# Patient Record
Sex: MALE | Race: OTHER
[De-identification: names, ages, dates, MRNs, and addresses within clinical notes are randomized per-mention and may not be internally consistent; named-entity substitution may affect disease eponyms.]

---

## 2018-06-23 ENCOUNTER — HOSPITAL ENCOUNTER (EMERGENCY)
Dept: HOSPITAL 72 - EMR | Age: 53
Discharge: HOME | End: 2018-06-23
Payer: MEDICAID

## 2018-06-23 VITALS — WEIGHT: 230 LBS | BODY MASS INDEX: 36.1 KG/M2 | HEIGHT: 67 IN

## 2018-06-23 VITALS — SYSTOLIC BLOOD PRESSURE: 150 MMHG | DIASTOLIC BLOOD PRESSURE: 76 MMHG

## 2018-06-23 VITALS — DIASTOLIC BLOOD PRESSURE: 76 MMHG | SYSTOLIC BLOOD PRESSURE: 150 MMHG

## 2018-06-23 DIAGNOSIS — R07.9: ICD-10-CM

## 2018-06-23 DIAGNOSIS — I10: ICD-10-CM

## 2018-06-23 DIAGNOSIS — R05: ICD-10-CM

## 2018-06-23 DIAGNOSIS — R10.13: Primary | ICD-10-CM

## 2018-06-23 DIAGNOSIS — E11.9: ICD-10-CM

## 2018-06-23 LAB
ADD MANUAL DIFF: NO
ALBUMIN SERPL-MCNC: 4.2 G/DL (ref 3.4–5)
ALBUMIN/GLOB SERPL: 1.2 {RATIO} (ref 1–2.7)
ALP SERPL-CCNC: 82 U/L (ref 46–116)
ALT SERPL-CCNC: 43 U/L (ref 12–78)
ANION GAP SERPL CALC-SCNC: 8 MMOL/L (ref 5–15)
AST SERPL-CCNC: 21 U/L (ref 15–37)
BASOPHILS NFR BLD AUTO: 2.1 % (ref 0–2)
BILIRUB SERPL-MCNC: 0.3 MG/DL (ref 0.2–1)
BUN SERPL-MCNC: 12 MG/DL (ref 7–18)
CALCIUM SERPL-MCNC: 9 MG/DL (ref 8.5–10.1)
CHLORIDE SERPL-SCNC: 105 MMOL/L (ref 98–107)
CK MB SERPL-MCNC: 1.5 NG/ML (ref 0–3.6)
CK SERPL-CCNC: 177 U/L (ref 26–308)
CO2 SERPL-SCNC: 26 MMOL/L (ref 21–32)
CREAT SERPL-MCNC: 1.2 MG/DL (ref 0.55–1.3)
EOSINOPHIL NFR BLD AUTO: 2.4 % (ref 0–3)
ERYTHROCYTE [DISTWIDTH] IN BLOOD BY AUTOMATED COUNT: 10.7 % (ref 11.6–14.8)
GLOBULIN SER-MCNC: 3.6 G/DL
HCT VFR BLD CALC: 42.6 % (ref 42–52)
HGB BLD-MCNC: 14.8 G/DL (ref 14.2–18)
LYMPHOCYTES NFR BLD AUTO: 34.5 % (ref 20–45)
MCV RBC AUTO: 89 FL (ref 80–99)
MONOCYTES NFR BLD AUTO: 10.5 % (ref 1–10)
NEUTROPHILS NFR BLD AUTO: 50.6 % (ref 45–75)
PLATELET # BLD: 249 K/UL (ref 150–450)
POTASSIUM SERPL-SCNC: 3.7 MMOL/L (ref 3.5–5.1)
RBC # BLD AUTO: 4.77 M/UL (ref 4.7–6.1)
SODIUM SERPL-SCNC: 139 MMOL/L (ref 136–145)
WBC # BLD AUTO: 7.4 K/UL (ref 4.8–10.8)

## 2018-06-23 PROCEDURE — 87040 BLOOD CULTURE FOR BACTERIA: CPT

## 2018-06-23 PROCEDURE — 85025 COMPLETE CBC W/AUTO DIFF WBC: CPT

## 2018-06-23 PROCEDURE — 36415 COLL VENOUS BLD VENIPUNCTURE: CPT

## 2018-06-23 PROCEDURE — 99283 EMERGENCY DEPT VISIT LOW MDM: CPT

## 2018-06-23 PROCEDURE — 80053 COMPREHEN METABOLIC PANEL: CPT

## 2018-06-23 PROCEDURE — 84484 ASSAY OF TROPONIN QUANT: CPT

## 2018-06-23 PROCEDURE — 83880 ASSAY OF NATRIURETIC PEPTIDE: CPT

## 2018-06-23 PROCEDURE — 93005 ELECTROCARDIOGRAM TRACING: CPT

## 2018-06-23 PROCEDURE — 82550 ASSAY OF CK (CPK): CPT

## 2018-06-23 PROCEDURE — 71045 X-RAY EXAM CHEST 1 VIEW: CPT

## 2018-06-23 PROCEDURE — 82553 CREATINE MB FRACTION: CPT

## 2018-06-23 NOTE — DIAGNOSTIC IMAGING REPORT
EXAM:

  XR Chest, 1 View

 

CLINICAL HISTORY:

  Chest pain

 

TECHNIQUE:

  Frontal view of the chest.

 

COMPARISON:

  No relevant prior studies available.

 

FINDINGS:

  Lungs:  Unremarkable.  No consolidation.

  Pleural space:  Unremarkable.  No pneumothorax.

  Heart:  Unremarkable.  No cardiomegaly.

  Mediastinum:  Unremarkable.

  Bones/joints:  No acute osseous abnormality.

 

IMPRESSION:     

  No acute cardiopulmonary process.

## 2018-06-23 NOTE — EMERGENCY ROOM REPORT
History of Present Illness


General


Chief Complaint:  Upper Respiratory Illness


Source:  Patient





Present Illness


HPI


Patient presents with complaints of epigastric pain burning sensation in the 

epigastric area





Reports that the symptoms worsened after he eats or drinks anything





Also had reported midsternal chest pain


Denies any diarrhea denies any lower abdominal pain


Patient is on proton pump inhibitor along with ACE inhibitor





Patient also has a mild cough


Allergies:  


Coded Allergies:  


     No Known Allergies (Unverified , 6/23/18)





Patient History


Past Medical History:  see triage record


Pertinent Family History:  none


Reviewed Nursing Documentation:  PMH: Agreed; PSxH: Agreed





Nursing Documentation-PMH


Past Medical History:  No History, Except For


Hx Hypertension:  Yes


Hx Diabetes:  Yes





Review of Systems


All Other Systems:  negative except mentioned in HPI





Physical Exam





Vital Signs








  Date Time  Temp Pulse Resp B/P (MAP) Pulse Ox O2 Delivery O2 Flow Rate FiO2


 


6/23/18 19:21 98.3 115 18 150/76 95 Room Air  





 98.2       








Sp02 EP Interpretation:  reviewed, normal


General Appearance:  well appearing, no apparent distress


Head:  normocephalic, atraumatic


Eyes:  bilateral eye PERRL, bilateral eye EOMI


ENT:  hearing grossly normal, normal pharynx, TMs + canals normal, uvula midline


Neck:  full range of motion, supple, no meningismus, no bony tend


Respiratory:  lungs clear, normal breath sounds, no rhonchi, no respiratory 

distress, no retraction, no accessory muscle use


Cardiovascular #1:  normal peripheral pulses, regular rate, rhythm, no edema, 

no gallop, no JVD, no murmur


Gastrointestinal:  normal bowel sounds, non tender, soft, no mass, no 

organomegaly, non-distended, no guarding, no hernia, no pulsatile mass, no 

rebound


Genitourinary:  no CVA tenderness


Musculoskeletal:  normal inspection


Neurologic:  oriented x3, responsive, CNs III-XII nml as tested, motor strength/

tone normal, sensory intact


Psychiatric:  mood/affect normal


Skin:  normal color, no rash, warm/dry, palpation normal


Lymphatic:  normal inspection, no adenopathy





Medical Decision Making


Diagnostic Impression:  


 Primary Impression:  


 Abdominal pain


 Additional Impressions:  


 Cough


 Cough due to ACE inhibitor


ER Course


Patient is a fairly complex patient with multiple differential to consideration 

including but not limited to cardiac cardiopulmonary and vascular emergencies





Patient's EKG and blood work are appropriate


X-ray was also negative I did discuss with the patient possibility of his blood 

pressure medication causing some of the cough patient also has clinical 

description of reflux esophagitis





Require close outpatient follow-up





Labs








Test


  6/23/18


19:40


 


White Blood Count


  7.4 K/UL


(4.8-10.8)


 


Red Blood Count


  4.77 M/UL


(4.70-6.10)


 


Hemoglobin


  14.8 G/DL


(14.2-18.0)


 


Hematocrit


  42.6 %


(42.0-52.0)


 


Mean Corpuscular Volume 89 FL (80-99) 


 


Mean Corpuscular Hemoglobin


  30.9 PG


(27.0-31.0)


 


Mean Corpuscular Hemoglobin


Concent 34.7 G/DL


(32.0-36.0)


 


Red Cell Distribution Width


  10.7 %


(11.6-14.8)


 


Platelet Count


  249 K/UL


(150-450)


 


Mean Platelet Volume


  6.6 FL


(6.5-10.1)


 


Neutrophils (%) (Auto)


  50.6 %


(45.0-75.0)


 


Lymphocytes (%) (Auto)


  34.5 %


(20.0-45.0)


 


Monocytes (%) (Auto)


  10.5 %


(1.0-10.0)


 


Eosinophils (%) (Auto)


  2.4 %


(0.0-3.0)


 


Basophils (%) (Auto)


  2.1 %


(0.0-2.0)


 


Sodium Level


  139 MMOL/L


(136-145)


 


Potassium Level


  3.7 MMOL/L


(3.5-5.1)


 


Chloride Level


  105 MMOL/L


()


 


Carbon Dioxide Level


  26 MMOL/L


(21-32)


 


Anion Gap


  8 mmol/L


(5-15)


 


Blood Urea Nitrogen


  12 mg/dL


(7-18)


 


Creatinine


  1.2 MG/DL


(0.55-1.30)


 


Estimat Glomerular Filtration


Rate > 60 mL/min


(>60)


 


Glucose Level


  108 MG/DL


()


 


Calcium Level


  9.0 MG/DL


(8.5-10.1)


 


Total Bilirubin


  0.3 MG/DL


(0.2-1.0)


 


Aspartate Amino Transf


(AST/SGOT) 21 U/L (15-37) 


 


 


Alanine Aminotransferase


(ALT/SGPT) 43 U/L (12-78) 


 


 


Alkaline Phosphatase


  82 U/L


()


 


Total Creatine Kinase


  177 U/L


()


 


Creatine Kinase MB


  1.5 NG/ML


(0.0-3.6)


 


Creatine Kinase MB Relative


Index 0.8 


 


 


Troponin I


  0.000 ng/mL


(0.000-0.056)


 


Pro-B-Type Natriuretic Peptide


  8 pg/mL


(0-125)


 


Total Protein


  7.8 G/DL


(6.4-8.2)


 


Albumin


  4.2 G/DL


(3.4-5.0)


 


Globulin 3.6 g/dL 


 


Albumin/Globulin Ratio 1.2 (1.0-2.7) 








EKG Diagnostic Results


Rate:  normal


Rhythm:  NSR


ST Segments:  no acute changes





Rhythm Strip Diag. Results


EP Interpretation:  yes


Rate:  76


Rhythm:  NSR, no PVC's, no ectopy





Chest X-Ray Diagnostic Results


Chest X-Ray Diagnostic Results :  


   Chest X-Ray Ordered:  Yes


   # of Views/Limited/Complete:  1 View


   Indication:  Chest Pain


   EP Interpretation:  Yes


   Interpretation:  no consolidation, no effusion, no pneumothorax


   Impression:  No acute disease


   Electronically Signed by:  Shahrzad Hyde DO





Last Vital Signs








  Date Time  Temp Pulse Resp B/P (MAP) Pulse Ox O2 Delivery O2 Flow Rate FiO2


 


6/23/18 19:21 98.3 115 18 150/76 95 Room Air  





 98.2       








Status:  improved


Disposition:  HOME, SELF-CARE


Condition:  Improved


Scripts


Mag Hydrox/Al Hydrox/Simeth (MAALOX MAXIMUM STRENGTH SUSP) 355 Ml Oral.susp


10 ML PO Q8HR for 7 Days, ML


   Prov: Shahrzad Hyde DO         6/23/18





Additional Instructions:  


Patient is provided with the discharge instructions notified to follow up with 

primary doctor in the next 2-3 days otherwise return to the er with any 

worsening symptoms.


Please note that this report is being documented using DRAGON technology.  This 

can lead to erroneous entry secondary to incorrect interpretation by the 

dictating instrument.











Shahrzad Hyde DO Jun 23, 2018 19:39

## 2018-06-27 NOTE — CARDIOLOGY REPORT
--------------- APPROVED REPORT --------------





EKG Measurement

Heart [id number]

SD [id number]

[id number]

[id number]

[id number]





Sinus tachycardia

Otherwise normal ECG